# Patient Record
Sex: MALE | Race: BLACK OR AFRICAN AMERICAN | Employment: FULL TIME | URBAN - METROPOLITAN AREA
[De-identification: names, ages, dates, MRNs, and addresses within clinical notes are randomized per-mention and may not be internally consistent; named-entity substitution may affect disease eponyms.]

---

## 2019-02-17 ENCOUNTER — APPOINTMENT (OUTPATIENT)
Dept: GENERAL RADIOLOGY | Age: 29
End: 2019-02-17

## 2019-02-17 ENCOUNTER — HOSPITAL ENCOUNTER (EMERGENCY)
Age: 29
Discharge: HOME | End: 2019-02-17
Attending: EMERGENCY MEDICINE

## 2019-02-17 VITALS
SYSTOLIC BLOOD PRESSURE: 140 MMHG | HEIGHT: 69 IN | WEIGHT: 143 LBS | OXYGEN SATURATION: 98 % | BODY MASS INDEX: 21.18 KG/M2 | RESPIRATION RATE: 20 BRPM | DIASTOLIC BLOOD PRESSURE: 71 MMHG | TEMPERATURE: 100 F | HEART RATE: 77 BPM

## 2019-02-17 DIAGNOSIS — J06.9 UPPER RESPIRATORY TRACT INFECTION, UNSPECIFIED TYPE: Primary | ICD-10-CM

## 2019-02-17 DIAGNOSIS — R50.9 FEBRILE ILLNESS: ICD-10-CM

## 2019-02-17 LAB
RAPID INFLUENZA  B AGN: NEGATIVE
RAPID INFLUENZA A AGN: NEGATIVE

## 2019-02-17 PROCEDURE — 6370000000 HC RX 637 (ALT 250 FOR IP): Performed by: NURSE PRACTITIONER

## 2019-02-17 PROCEDURE — 94640 AIRWAY INHALATION TREATMENT: CPT

## 2019-02-17 PROCEDURE — 6360000002 HC RX W HCPCS: Performed by: NURSE PRACTITIONER

## 2019-02-17 PROCEDURE — 94664 DEMO&/EVAL PT USE INHALER: CPT

## 2019-02-17 PROCEDURE — 94760 N-INVAS EAR/PLS OXIMETRY 1: CPT

## 2019-02-17 PROCEDURE — 99283 EMERGENCY DEPT VISIT LOW MDM: CPT

## 2019-02-17 PROCEDURE — 87804 INFLUENZA ASSAY W/OPTIC: CPT

## 2019-02-17 PROCEDURE — 71046 X-RAY EXAM CHEST 2 VIEWS: CPT

## 2019-02-17 RX ORDER — ACETAMINOPHEN 500 MG
1000 TABLET ORAL ONCE
Status: COMPLETED | OUTPATIENT
Start: 2019-02-17 | End: 2019-02-17

## 2019-02-17 RX ORDER — IBUPROFEN 800 MG/1
800 TABLET ORAL ONCE
Status: COMPLETED | OUTPATIENT
Start: 2019-02-17 | End: 2019-02-17

## 2019-02-17 RX ORDER — ACETAMINOPHEN 500 MG
1000 TABLET ORAL EVERY 6 HOURS PRN
Qty: 30 TABLET | Refills: 0 | Status: SHIPPED | OUTPATIENT
Start: 2019-02-17

## 2019-02-17 RX ORDER — IBUPROFEN 800 MG/1
800 TABLET ORAL EVERY 8 HOURS PRN
Qty: 20 TABLET | Refills: 0 | Status: SHIPPED | OUTPATIENT
Start: 2019-02-17

## 2019-02-17 RX ORDER — GUAIFENESIN/DEXTROMETHORPHAN 100-10MG/5
5 SYRUP ORAL 3 TIMES DAILY PRN
Qty: 120 ML | Refills: 0 | Status: SHIPPED | OUTPATIENT
Start: 2019-02-17 | End: 2019-02-27

## 2019-02-17 RX ORDER — ALBUTEROL SULFATE 2.5 MG/3ML
SOLUTION RESPIRATORY (INHALATION)
Status: DISCONTINUED
Start: 2019-02-17 | End: 2019-02-17 | Stop reason: HOSPADM

## 2019-02-17 RX ORDER — ALBUTEROL SULFATE 2.5 MG/3ML
5 SOLUTION RESPIRATORY (INHALATION) ONCE
Status: COMPLETED | OUTPATIENT
Start: 2019-02-17 | End: 2019-02-17

## 2019-02-17 RX ORDER — HALOPERIDOL 10 MG/1
10 TABLET ORAL 4 TIMES DAILY
COMMUNITY

## 2019-02-17 RX ORDER — ALBUTEROL SULFATE 90 UG/1
2 AEROSOL, METERED RESPIRATORY (INHALATION) EVERY 4 HOURS PRN
Qty: 1 INHALER | Refills: 0 | Status: SHIPPED | OUTPATIENT
Start: 2019-02-17

## 2019-02-17 RX ADMIN — ALBUTEROL SULFATE 5 MG: 2.5 SOLUTION RESPIRATORY (INHALATION) at 15:23

## 2019-02-17 RX ADMIN — IBUPROFEN 800 MG: 800 TABLET, FILM COATED ORAL at 15:21

## 2019-02-17 RX ADMIN — ACETAMINOPHEN 1000 MG: 500 TABLET, FILM COATED ORAL at 15:21

## 2019-02-17 ASSESSMENT — ENCOUNTER SYMPTOMS
DIARRHEA: 0
COUGH: 1
SHORTNESS OF BREATH: 0
CHEST TIGHTNESS: 0
VOMITING: 0
NAUSEA: 0
ABDOMINAL PAIN: 0

## 2019-02-17 ASSESSMENT — PAIN DESCRIPTION - LOCATION: LOCATION: CHEST

## 2019-02-17 ASSESSMENT — PAIN SCALES - GENERAL
PAINLEVEL_OUTOF10: 7
PAINLEVEL_OUTOF10: 7

## 2021-07-17 ENCOUNTER — HOSPITAL ENCOUNTER (EMERGENCY)
Age: 31
Discharge: HOME HEALTH | End: 2021-07-17
Payer: COMMERCIAL

## 2021-07-17 VITALS
HEART RATE: 85 BPM | SYSTOLIC BLOOD PRESSURE: 119 MMHG | OXYGEN SATURATION: 100 % | DIASTOLIC BLOOD PRESSURE: 69 MMHG | RESPIRATION RATE: 16 BRPM | TEMPERATURE: 98 F

## 2021-07-17 DIAGNOSIS — R30.0 DYSURIA: Primary | ICD-10-CM

## 2021-07-17 LAB
BILIRUBIN URINE: NEGATIVE
BLOOD, URINE: NEGATIVE
CLARITY: CLEAR
COLOR: YELLOW
EPITHELIAL CELLS, UA: 0 /HPF (ref 0–5)
GLUCOSE URINE: NEGATIVE MG/DL
HYALINE CASTS: 0 /LPF (ref 0–8)
KETONES, URINE: ABNORMAL MG/DL
LEUKOCYTE ESTERASE, URINE: ABNORMAL
MICROSCOPIC EXAMINATION: YES
NITRITE, URINE: NEGATIVE
PH UA: 6 (ref 5–8)
PROTEIN UA: NEGATIVE MG/DL
RBC UA: 2 /HPF (ref 0–4)
SPECIFIC GRAVITY UA: 1.03 (ref 1–1.03)
URINE REFLEX TO CULTURE: ABNORMAL
URINE TRICHOMONAS EVALUATION: NORMAL
URINE TYPE: ABNORMAL
UROBILINOGEN, URINE: 1 E.U./DL
WBC UA: 2 /HPF (ref 0–5)

## 2021-07-17 PROCEDURE — 99282 EMERGENCY DEPT VISIT SF MDM: CPT

## 2021-07-17 PROCEDURE — 96372 THER/PROPH/DIAG INJ SC/IM: CPT

## 2021-07-17 PROCEDURE — 6360000002 HC RX W HCPCS: Performed by: NURSE PRACTITIONER

## 2021-07-17 PROCEDURE — 6370000000 HC RX 637 (ALT 250 FOR IP): Performed by: NURSE PRACTITIONER

## 2021-07-17 PROCEDURE — 81001 URINALYSIS AUTO W/SCOPE: CPT

## 2021-07-17 PROCEDURE — 87591 N.GONORRHOEAE DNA AMP PROB: CPT

## 2021-07-17 PROCEDURE — 87491 CHLMYD TRACH DNA AMP PROBE: CPT

## 2021-07-17 RX ORDER — AZITHROMYCIN 250 MG/1
1000 TABLET, FILM COATED ORAL ONCE
Status: COMPLETED | OUTPATIENT
Start: 2021-07-17 | End: 2021-07-17

## 2021-07-17 RX ORDER — CEFTRIAXONE 1 G/1
500 INJECTION, POWDER, FOR SOLUTION INTRAMUSCULAR; INTRAVENOUS ONCE
Status: COMPLETED | OUTPATIENT
Start: 2021-07-17 | End: 2021-07-17

## 2021-07-17 RX ADMIN — AZITHROMYCIN MONOHYDRATE 1000 MG: 250 TABLET ORAL at 21:10

## 2021-07-17 RX ADMIN — CEFTRIAXONE SODIUM 500 MG: 1 INJECTION, POWDER, FOR SOLUTION INTRAMUSCULAR; INTRAVENOUS at 21:11

## 2021-07-17 ASSESSMENT — ENCOUNTER SYMPTOMS
NAUSEA: 0
SHORTNESS OF BREATH: 0
VOMITING: 0
DIARRHEA: 0
ABDOMINAL PAIN: 0
CHEST TIGHTNESS: 0

## 2021-07-17 ASSESSMENT — PAIN SCALES - GENERAL: PAINLEVEL_OUTOF10: 2

## 2021-07-18 NOTE — ED PROVIDER NOTES
[facility]        Pt Name: Abdiel Haddad  MRN: [id number]  [place] 1990  Date of evaluation: 7/17/2021  Provider: CHARLY Rico CNP  PCP: No primary care provider on file. Note Started: 9:25 PM EDT       MARCOS. I have evaluated this patient. My supervising physician was available for consultation. CHIEF COMPLAINT       Chief Complaint   Patient presents with    Exposure to STD     Pt would like to be checked for STI       HISTORY OF PRESENT ILLNESS   (Location, Timing/Onset, Context/Setting, Quality, Duration, Modifying Factors, Severity, Associated Signs and Symptoms)  Note limiting factors. Chief Complaint: painful/burning with urination     Abdiel Haddad is a 27 y.o. male who presents to the emergency department complaining of burning with urination and concern of STD. No mitigating exacerbating factor. No penile discharge or penile lesions. No testicular pain. Denies any headache, fever, lightheadedness, dizziness, visual disturbances. No chest pain or pressure. No neck or back pain. No shortness of breath, cough, or congestion. No abdominal pain, nausea, vomiting, diarrhea, constipation. No rash. Nursing Notes were all reviewed and agreed with or any disagreements were addressed in the HPI. REVIEW OF SYSTEMS    (2-9 systems for level 4, 10 or more for level 5)     Review of Systems   Constitutional: Negative for activity change, chills and fever. Respiratory: Negative for chest tightness and shortness of breath. Cardiovascular: Negative for chest pain. Gastrointestinal: Negative for abdominal pain, diarrhea, nausea and vomiting. Genitourinary: Positive for dysuria. All other systems reviewed and are negative. Positives and Pertinent negatives as per HPI. Except as noted above in the ROS, all other systems were reviewed and negative.        PAST MEDICAL HISTORY     Past Medical History:   Diagnosis Date    Asthma     Bipolar affective disorder ([facility] [address]          SURGICAL HISTORY   History reviewed. No pertinent surgical history. [address]       Discharge Medication List as of 7/17/2021  9:08 PM      CONTINUE these medications which have NOT CHANGED    Details   haloperidol (HALDOL) 10 MG tablet Take 10 mg by mouth 4 times dailyHistorical Med      acetaminophen (APAP EXTRA STRENGTH) 500 MG tablet Take 2 tablets by mouth every 6 hours as needed for Pain, Disp-30 tablet, R-0Print      ibuprofen (ADVIL;MOTRIN) 800 MG tablet Take 1 tablet by mouth every 8 hours as needed for Pain or Fever, Disp-20 tablet, R-0Print      albuterol sulfate HFA (PROVENTIL HFA) 108 (90 Base) MCG/ACT inhaler Inhale 2 puffs into the lungs every 4 hours as needed for Wheezing or Shortness of Breath (Space out to every 6 hours as symptoms improve) Space out to every 6 hours as symptoms improve., Disp-1 Inhaler, R-0Print               ALLERGIES     Patient has no known allergies. FAMILYHISTORY     History reviewed. No pertinent family history. SOCIAL HISTORY       Social History     Tobacco Use    Smoking status: Current Every Day Smoker     Packs/day: 0.50     Types: Cigarettes    Smokeless tobacco: Never Used   Substance Use Topics    Alcohol use: Yes     Alcohol/week: 2.0 standard drinks     Types: 2 Cans of beer per week    Drug use: Yes     Types: Marijuana       SCREENINGS             PHYSICAL EXAM    (up to 7 for level 4, 8 or more for level 5)     ED Triage Vitals [07/17/21 2057]   BP Temp Temp Source Pulse Resp SpO2 Height Weight   119/69 98 °F (36.7 °C) Oral 85 16 100 % -- --       Physical Exam  Vitals and nursing note reviewed. Constitutional:       Appearance: He is well-developed. He is not diaphoretic. HENT:      Head: Normocephalic and atraumatic. Right Ear: External ear normal.      Left Ear: External ear normal.   Eyes:      General:         Right eye: No discharge. Left eye: No discharge. Neck:      Vascular: No JVD. Cardiovascular:      Rate and Rhythm: Normal rate and regular rhythm. Pulses: Normal pulses. Heart sounds: Normal heart sounds. Pulmonary:      Effort: Pulmonary effort is normal. No respiratory distress. Breath sounds: Normal breath sounds. Abdominal:      Palpations: Abdomen is soft. Musculoskeletal:         General: Normal range of motion. Cervical back: Normal range of motion and neck supple. Skin:     General: Skin is warm and dry. Coloration: Skin is not pale. Neurological:      Mental Status: He is alert and oriented to person, place, and time. Psychiatric:         Behavior: Behavior normal.         DIAGNOSTIC RESULTS   LABS:    Labs Reviewed   URINE RT REFLEX TO CULTURE - Abnormal; Notable for the following components:       Result Value    Ketones, Urine TRACE (*)     Leukocyte Esterase, Urine TRACE (*)     All other components within normal limits    Narrative:     Performed at:  [facility]  [address], [address]   Phone [phone]   C.TRACHOMATIS N.GONORRHOEAE DNA, [facility]       When ordered only abnormal lab results are displayed. All other labs were within normal range or not returned as of this dictation. EKG: When ordered, EKG's are interpreted by the Emergency Department Physician in the absence of a cardiologist.  Please see their note for interpretation of EKG. RADIOLOGY:   Non-plain film images such as CT, Ultrasound and MRI are read by the radiologist. Plain radiographic images are visualized and preliminarily interpreted by the ED Provider with the below findings:        Interpretation per the Radiologist below, if available at the time of this note:    No orders to display     No results found.         PROCEDURES   Unless otherwise noted below, none     Procedures    CRITICAL CARE TIME N/A    CONSULTS:  None      EMERGENCY DEPARTMENT COURSE and DIFFERENTIAL DIAGNOSIS/MDM:   Vitals:    Vitals:    07/17/21 2057   BP: 119/69   Pulse: 85   Resp: 16   Temp: 98 °F (36.7 °C)   TempSrc: Oral   SpO2: 100%       Patient was given the following medications:  Medications   sterile water injection (has no administration in time range)   cefTRIAXone (ROCEPHIN) injection 500 mg (500 mg Intramuscular Given 7/17/21 2111)   azithromycin (ZITHROMAX) tablet 1,000 mg (1,000 mg Oral Given 7/17/21 2110)           Briefly, this is a 27year old male that presents to the emergency department with painful urination, concern for STD. He is prophylactically treated with Rocephin and azithromycin. GC chlamydia is pending. Follow-up for results. FINAL IMPRESSION      1.  Dysuria          DISPOSITION/PLAN   DISPOSITION Decision To Discharge 07/17/2021 09:16:11 PM      PATIENT REFERRED TO:  your doctor            DISCHARGE MEDICATIONS:  Discharge Medication List as of 7/17/2021  9:08 PM          DISCONTINUED MEDICATIONS:  Discharge Medication List as of 7/17/2021  9:08 PM                 (Please note that portions of this note were completed with a voice recognition program.  Efforts were made to edit the dictations but occasionally words are mis-transcribed.)    CHARLY Cavanaugh CNP (electronically signed)           CHARLY Cavanaugh CNP  07/17/21 2127

## 2021-07-20 LAB
C. TRACHOMATIS DNA ,URINE: NEGATIVE
N. GONORRHOEAE DNA, URINE: NEGATIVE

## 2025-05-26 ENCOUNTER — APPOINTMENT (OUTPATIENT)
Dept: GENERAL RADIOLOGY | Age: 35
End: 2025-05-26
Attending: EMERGENCY MEDICINE
Payer: MEDICARE

## 2025-05-26 ENCOUNTER — HOSPITAL ENCOUNTER (EMERGENCY)
Age: 35
Discharge: HOME | End: 2025-05-26
Attending: EMERGENCY MEDICINE
Payer: MEDICARE

## 2025-05-26 VITALS
RESPIRATION RATE: 18 BRPM | BODY MASS INDEX: 22.22 KG/M2 | SYSTOLIC BLOOD PRESSURE: 145 MMHG | HEART RATE: 55 BPM | WEIGHT: 155.2 LBS | HEIGHT: 70 IN | OXYGEN SATURATION: 98 % | DIASTOLIC BLOOD PRESSURE: 80 MMHG | TEMPERATURE: 98.2 F

## 2025-05-26 DIAGNOSIS — S99.922A INJURY OF TOE ON LEFT FOOT, INITIAL ENCOUNTER: Primary | ICD-10-CM

## 2025-05-26 DIAGNOSIS — S91.209A NAIL AVULSION OF TOE, INITIAL ENCOUNTER: ICD-10-CM

## 2025-05-26 PROCEDURE — 99283 EMERGENCY DEPT VISIT LOW MDM: CPT

## 2025-05-26 PROCEDURE — 73660 X-RAY EXAM OF TOE(S): CPT

## 2025-05-26 PROCEDURE — 6370000000 HC RX 637 (ALT 250 FOR IP): Performed by: EMERGENCY MEDICINE

## 2025-05-26 PROCEDURE — 11730 AVULSION NAIL PLATE SIMPLE 1: CPT

## 2025-05-26 RX ORDER — ARIPIPRAZOLE 10 MG/1
TABLET ORAL
COMMUNITY

## 2025-05-26 RX ORDER — IBUPROFEN 400 MG/1
400 TABLET, FILM COATED ORAL ONCE
Status: COMPLETED | OUTPATIENT
Start: 2025-05-26 | End: 2025-05-26

## 2025-05-26 RX ADMIN — IBUPROFEN 400 MG: 400 TABLET, FILM COATED ORAL at 13:02

## 2025-05-26 ASSESSMENT — PAIN SCALES - GENERAL
PAINLEVEL_OUTOF10: 8
PAINLEVEL_OUTOF10: 8

## 2025-05-26 ASSESSMENT — PAIN - FUNCTIONAL ASSESSMENT: PAIN_FUNCTIONAL_ASSESSMENT: 0-10

## 2025-05-26 ASSESSMENT — PAIN DESCRIPTION - ORIENTATION: ORIENTATION: LEFT

## 2025-05-26 ASSESSMENT — PAIN DESCRIPTION - LOCATION: LOCATION: TOE (COMMENT WHICH ONE)

## 2025-05-26 NOTE — ED PROVIDER NOTES
[facility] EMERGENCY DEPARTMENT PROVIDER NOTE    Patient Identification  Pt Name: Stefany Norris  MRN: [id number]  Birthdate 1990  Date of evaluation: 5/26/2025  Provider: Ryley Gambino MD  PCP: No primary care provider on file.    HPI  Stefany Norris is a 34 y.o. male who presents to the ED for ***. Toenail avulsion and toe injury. Patient initially injured his right 1st toe when he accidentally dropped a couch on it two weeks ago.     No other complaints, modifying factors or associated symptoms.     Nursing notes reviewed.   Allergies: Patient has no known allergies.  Past medical history:   Past Medical History:   Diagnosis Date    Asthma     Bipolar affective disorder (HCC)      Past surgical history: History reviewed. No pertinent surgical history.  Home medications:   Previous Medications    ACETAMINOPHEN (APAP EXTRA STRENGTH) 500 MG TABLET    Take 2 tablets by mouth every 6 hours as needed for Pain    ALBUTEROL SULFATE HFA (PROVENTIL HFA) 108 (90 BASE) MCG/ACT INHALER    Inhale 2 puffs into the lungs every 4 hours as needed for Wheezing or Shortness of Breath (Space out to every 6 hours as symptoms improve) Space out to every 6 hours as symptoms improve.    ARIPIPRAZOLE (ABILIFY) 10 MG TABLET    aripiprazole 10 mg tablet    IBUPROFEN (ADVIL;MOTRIN) 800 MG TABLET    Take 1 tablet by mouth every 8 hours as needed for Pain or Fever     Social history:  reports that he has been smoking cigarettes. He has never used smokeless tobacco. He reports current alcohol use of about 2.0 standard drinks of alcohol per week. He reports current drug use. Drug: Marijuana (Weed).  Family history:  History reviewed. No pertinent family history.    PHYSICAL EXAM  BP (!) 145/80   Pulse 55   Temp 98.2 °F (36.8 °C) (Oral)   Resp 18   Ht 1.778 m (5' 10\")   Wt 70.4 kg (155 lb 3.3 oz)   SpO2 98%   BMI 22.27 kg/m²   GENERAL APPEARANCE: Awake and alert. Cooperative. No acute distress.  HEAD: Normocephalic.  about 2.0 standard drinks of alcohol per week. He reports current drug use. Drug: Marijuana (Weed).  Family history:  History reviewed. No pertinent family history.    PHYSICAL EXAM  BP (!) 145/80   Pulse 55   Temp 98.2 °F (36.8 °C) (Oral)   Resp 18   Ht 1.778 m (5' 10\")   Wt 70.4 kg (155 lb 3.3 oz)   SpO2 98%   BMI 22.27 kg/m²   GENERAL APPEARANCE: Awake and alert. Cooperative. No acute distress.  CV: Brisk capillary refill distally in the affected toe.  LUNGS: Breathing is unlabored. Speaking comfortably in full sentences.  EXTREMITIES: MAEE.  The affected toe is tender, but not swollen.  No visible or palpable evidence of bony deformity.  Toenail is partially avulsed with new toenail growing underneath.  Is unclear whether there is a nailbed laceration or not.  SKIN: Warm and dry.  NEUROLOGICAL: Alert and oriented.  Normal coordination.    PROCEDURES  Nail Removal    Date/Time: 5/26/2025 2:03 PM    Performed by: Ryley Gambino MD  Authorized by: Ryley Gambino MD    Consent:     Consent obtained:  Verbal    Consent given by:  Patient    Risks, benefits, and alternatives were discussed: yes    Universal protocol:     Patient identity confirmed:  Verbally with patient  Pre-procedure details:     Skin preparation:  Povidone-iodine  Procedure details:     Location:  Foot    Foot location:  L big toe  Anesthesia:     Anesthesia method:  Nerve block    Block location:  Digital block of toe (see separate documentation)  Nail Removal:     Nail removed:  Complete    Nail bed sutured: No laceration to repair.    Nails trimmed:     Number of nails trimmed:  1  Post-procedure details:     Procedure completion:  Tolerated well, no immediate complications  Nerve Block    Date/Time: 5/26/2025 2:04 PM    Performed by: Ryley Gambino MD  Authorized by: Ryley Gambino MD    Consent:     Consent obtained:  Verbal    Consent given by:  Patient    Risks, benefits, and alternatives were discussed: yes    Universal

## 2025-05-26 NOTE — ED NOTES
Patient DCed from ED at this time. Discussed AVS, follow up, and scripts. They verbalized understanding. Reinforced that should symptoms persist or worsen to return to the ED. They verbalized understanding. Patient ambulated out of ED. RN thanked patient for choosing [facility].